# Patient Record
Sex: MALE | Race: BLACK OR AFRICAN AMERICAN | URBAN - METROPOLITAN AREA
[De-identification: names, ages, dates, MRNs, and addresses within clinical notes are randomized per-mention and may not be internally consistent; named-entity substitution may affect disease eponyms.]

---

## 2018-09-24 ENCOUNTER — HISTORICAL (OUTPATIENT)
Dept: ADMINISTRATIVE | Facility: HOSPITAL | Age: 33
End: 2018-09-24

## 2019-06-14 ENCOUNTER — HISTORICAL (OUTPATIENT)
Dept: ADMINISTRATIVE | Facility: HOSPITAL | Age: 34
End: 2019-06-14

## 2019-06-20 LAB
FINAL CULTURE: NORMAL
FINAL CULTURE: NORMAL

## 2022-04-09 ENCOUNTER — HISTORICAL (OUTPATIENT)
Dept: ADMINISTRATIVE | Facility: HOSPITAL | Age: 37
End: 2022-04-09

## 2022-04-25 VITALS — WEIGHT: 315 LBS | BODY MASS INDEX: 44.1 KG/M2 | HEIGHT: 71 IN

## 2022-05-02 NOTE — HISTORICAL [facility] CERNER
This is a historical note converted from Denis. Formatting and pictures may have been removed.  Please reference Denis for original formatting and attached multimedia. Chief Complaint  Referral for Rt wrist pain  History of Present Illness  32-year-old male?new patient referred to us with right wrist pain.? He does not report any sort of?inciting event or trauma.??He has no history of trauma to this wrist. ?He said it is really started bothering him since the start of the year. ?No injury at that time. ?Says this happened once before in the past. ?He has not tried any therapy. ?He does have a history of gout and says he has been taking indomethacin 3 times a day?for prophylaxis.? He has been able to work through the pain in his wrist.  Review of Systems  Constitutional: No fevers, chills or night sweats  Eye: No blurry vision  ENMT: No sore throat or cough  Respiratory:?No shortness of breath  Cardiovascular: No chest pain  Gastrointestinal:?No abdominal pain, nausea or vomiting  Genitourinary:?No dysuria  Hema/Lymph:?No abnormal bleeding or bruising  Endocrine:?No signs of hyper or hypothyroidism  Immunologic:?No rash  Musculoskeletal:?See HPI  Integumentary:?No lesions  Neurologic:?See HPI  All Other ROS:?Negative except as noted above  Physical Exam  Vitals & Measurements  HT:?180?cm? HT:?180?cm? WT:?153.2?kg? WT:?153.2?kg? BMI:?47.28?  General: No acute distress  HEENT:?Normocephalic atraumatic  Cardiovascular:?Regular rate and rhythm for peripheral pulses  Respiratory: Normal work of breathing on room air  GI:?Nondistended  Psych:?Appropriate affect  Neuro:?GCS 15  ?  RUE:?Minimal swelling about the carpus. ?He is tender to palpation dorsally?in the central aspect of his wrist. ?No tenderness radially, ulnarly or volarly.? Range of motion: Flexion 20?, extension 20?, 20? of?radial and ulnar deviation.? Neurovascularly intact.  ?  Previous x-rays were reviewed and demonstrates?significant degeneration/necrosis  of the lunate with development of slack wrist. 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 ?We discussed treatment options?including therapy and anti-inflammatories. ?I think he is on too much indomethacin and probably should be on allopurinol. ?I encouraged him to talk to his PCP about this. ?If he can get off?all that indomethacin?we could consider Mobic. ?I do not think he should be on any more anti-inflammatories currently given his intake. ?He does smoke and I told him before we could consider any surgical option he would need to?quit smoking. ?He understands this. ?He is on board with trying nonoperative management.? We will give him a OT referral and see him back in 3 months to see how he is doing.   Problem List/Past Medical History  Ongoing  Cellulitis of lower limb  Morbid obesity  Nondisplaced fracture of shaft of third metacarpal bone, left hand, initial encounter for closed fracture  Tobacco user  Historical  No qualifying data  Procedure/Surgical History  Application of short arm splint (forearm to hand); static (01/30/2018)  Nose operation   Medications  Colcrys 0.6 mg oral tablet, 0.3 mg= 0.5 tab(s), Oral, BID,? ?Not taking  indomethacin 50 mg oral capsule, 50 mg= 1 cap(s), Oral, TID, PRN  Allergies  Seafood  Social History  Alcohol  Current, Beer, 1-2 times per year, 08/05/2016  Substance Abuse  Never, 08/05/2016  Tobacco  Current every day smoker Use:. Cigars Type:. 3 per day., 09/24/2018  Family History  Diabetes mellitus type 2: Mother and Brother.  Hyperlipidemia.: Mother.  Hypertension.: Mother.  Health Maintenance  Health Maintenance  ???Pending?(in the next year)  ??? ??Due?  ??? ? ? ?ADL Screening due??09/24/18??and every 1??year(s)  ??? ? ? ?Alcohol Misuse Screening due??09/24/18??and every  1??year(s)  ??? ? ? ?Tetanus Vaccine due??09/24/18??and every 10??year(s)  ???Satisfied?(in the past 1 year)  ??? ??Satisfied?  ??? ? ? ?Blood Pressure Screening on??08/18/18.??Satisfied by Ewa Ernandez  ??? ? ? ?Body Mass Index Check on??09/24/18.??Satisfied by Mao Rose  ??? ? ? ?Diabetes Screening on??09/13/18.??Satisfied by Sera Portillo  ??? ? ? ?Influenza Vaccine on??09/24/18.??Satisfied by Mao Rose  ??? ? ? ?Obesity Screening on??09/24/18.??Satisfied by Mao Rose  ??? ? ? ?Smoking Cessation on??09/24/18.??Satisfied by Mao Rose  ?  ?      Edgar Call?s?medical history, physical exam findings right wrist, diagnosis, and treatment outlined by??Anthony?has been reviewed.??Treatment plan is determined to be reasonable and appropriate.?I was present during the evaluation. ?X-rays right wrist?reviewed. Smoking cessation is important.